# Patient Record
(demographics unavailable — no encounter records)

---

## 2024-10-21 NOTE — HISTORY OF PRESENT ILLNESS
[FreeTextEntry1] :  54-year-old white male with a past medical history of type 2 diabetes who is currently taking insulin Basaglar 40 units daily and insulin Fiasp 10 to 20 units 3 times a day before meals.  Patient was also taking Steglatro 15 mg daily but the insurance had denied the coverage and he had to stop the medication.  According to the patient his fingersticks have been fluctuating anywhere between 80 to 200 mg per DL.  After the meals they usually climb up to 250 mg per DL.  Patient was given a trial with Jardiance  25 mg tablets daily but he developed a diffuse itchy rash for this reason he had to stop taking the medication.  Physically the patient is quite active but his appetite is very good and he is on a regular basis.  He denies any significant polyuria or polydipsia.  His vision has been stable and he has not noticed any numbness or tingling of the extremities.  Review of systems is

## 2024-10-21 NOTE — REVIEW OF SYSTEMS
[Fatigue] : no fatigue [Decreased Appetite] : appetite not decreased [Recent Weight Gain (___ Lbs)] : recent weight gain: [unfilled] lbs [Negative] : Heme/Lymph

## 2024-10-21 NOTE — ASSESSMENT
[Diabetes Foot Care] : diabetes foot care [Long Term Vascular Complications] : long term vascular complications of diabetes [Carbohydrate Consistent Diet] : carbohydrate consistent diet [Importance of Diet and Exercise] : importance of diet and exercise to improve glycemic control, achieve weight loss and improve cardiovascular health [Exercise/Effect on Glucose] : exercise/effect on glucose [Hypoglycemia Management] : hypoglycemia management [Self Monitoring of Blood Glucose] : self monitoring of blood glucose [Retinopathy Screening] : Patient was referred to ophthalmology for retinopathy screening [FreeTextEntry1] :  patient white male who has a past medical history of for type 2 diabetes currently on insulin therapy and Steglatro which was discontinued.  His glucose level has been elevated and in the clinic it was 329 mg per DL.  His last hemoglobin A1c was 8.1%.  Patient physically is quite active but I suspect that he is dietary habits are still not optimal.  He does not show any signs of vascular complications  Except for erectile dysfunction.  Recommendation 1.  I have advised the patient to obtain a complete blood test including hemoglobin A1c level 2.  I will also give the patient a trial with Farxiga 10 mg tablets on a daily basis and if he develops a side effect with the skin rash then we may have to change it to Ozempic. 3.  Patient will continue with the insulin Basaglar 40 units every day together with the insulin Fiasp before the meals. 4.  The importance of diet exercise was again discussed with the patient. 5.  If the condition remained stable and the patient will follow-up in 3 months.  The plan was discussed with the patient thank you

## 2024-10-21 NOTE — PHYSICAL EXAM
[Alert] : alert [Well Nourished] : well nourished [Healthy Appearance] : healthy appearance [No Acute Distress] : no acute distress [Well Developed] : well developed [Normal Sclera/Conjunctiva] : normal sclera/conjunctiva [EOMI] : extra ocular movement intact [No Proptosis] : no proptosis [Normal Oropharynx] : the oropharynx was normal [Thyroid Not Enlarged] : the thyroid was not enlarged [No Thyroid Nodules] : no palpable thyroid nodules [No Respiratory Distress] : no respiratory distress [No Accessory Muscle Use] : no accessory muscle use [Clear to Auscultation] : lungs were clear to auscultation bilaterally [Normal S1, S2] : normal S1 and S2 [Normal Rate] : heart rate was normal [Regular Rhythm] : with a regular rhythm [No Edema] : no peripheral edema [Pedal Pulses Normal] : the pedal pulses are present [Normal Bowel Sounds] : normal bowel sounds [Not Tender] : non-tender [Not Distended] : not distended [Soft] : abdomen soft [Normal Anterior Cervical Nodes] : no anterior cervical lymphadenopathy [Normal Posterior Cervical Nodes] : no posterior cervical lymphadenopathy [No Spinal Tenderness] : no spinal tenderness [Spine Straight] : spine straight [No Stigmata of Cushings Syndrome] : no stigmata of Cushings Syndrome [Normal Gait] : normal gait [Normal Strength/Tone] : muscle strength and tone were normal [No Rash] : no rash [Acanthosis Nigricans] : no acanthosis nigricans [Normal Reflexes] : deep tendon reflexes were 2+ and symmetric [No Tremors] : no tremors [Oriented x3] : oriented to person, place, and time [de-identified] :   Patient's BMI is 24.6

## 2025-02-10 NOTE — PHYSICAL EXAM
[Alert] : alert [Well Nourished] : well nourished [No Acute Distress] : no acute distress [Well Developed] : well developed [Normal Sclera/Conjunctiva] : normal sclera/conjunctiva [EOMI] : extra ocular movement intact [No Proptosis] : no proptosis [Normal Oropharynx] : the oropharynx was normal [No Neck Mass] : no neck mass was observed [Thyroid Not Enlarged] : the thyroid was not enlarged [No Thyroid Nodules] : no palpable thyroid nodules [No Respiratory Distress] : no respiratory distress [No Accessory Muscle Use] : no accessory muscle use [Clear to Auscultation] : lungs were clear to auscultation bilaterally [Normal S1, S2] : normal S1 and S2 [Normal Rate] : heart rate was normal [Regular Rhythm] : with a regular rhythm [Carotids Normal] : carotid pulses were normal with no bruits [No Edema] : no peripheral edema [Pedal Pulses Normal] : the pedal pulses are present [Normal Appearance] : normal in appearance [No Masses] : no palpable masses [Normal Bowel Sounds] : normal bowel sounds [Not Tender] : non-tender [Not Distended] : not distended [Soft] : abdomen soft [No HSM] : no hepato-splenomegaly [Normal Supraclavicular Nodes] : no supraclavicular lymphadenopathy [Normal Anterior Cervical Nodes] : no anterior cervical lymphadenopathy [Normal Posterior Cervical Nodes] : no posterior cervical lymphadenopathy [No Spinal Tenderness] : no spinal tenderness [Spine Straight] : spine straight [No Stigmata of Cushings Syndrome] : no stigmata of Cushings Syndrome [Normal Gait] : normal gait [Normal Strength/Tone] : muscle strength and tone were normal [No Rash] : no rash [No Skin Lesions] : no skin lesions [Acanthosis Nigricans] : no acanthosis nigricans [Foot Ulcers] : no foot ulcers [No Motor Deficits] : the motor exam was normal [No Sensory Deficits] : the sensory exam was normal to light touch and pinprick [Normal Reflexes] : deep tendon reflexes were 2+ and symmetric [No Tremors] : no tremors [Normal Sensation on Monofilament Testing] : normal sensation on monofilament testing of lower extremities [Oriented x3] : oriented to person, place, and time [de-identified] :   Patient's BMI is at 25.2

## 2025-02-10 NOTE — HISTORY OF PRESENT ILLNESS
[FreeTextEntry1] : 54-year-old male with a medical history of DM2 present for a routine follow up visit. Patient is compliant with taking medications of Basaglar insulin, Dapagliflozin Propanediol, Farxiga, Fiasp, Finasteride, Steglarto, Aspirin, lisinopril, Promalyst. Patient has a past medical history of multiple myeloma cells for which he is under chemotherapy for. Patient denies any other significant symptoms of chest pain, sob, abdominal pain, nausea or vomiting. He states that recently has had low glucose levels in the morning down to the mid 50's. Patient is quite physically active by working out in the gym daily. His is compliant with his diet, weight is stable. He has not noticed any changes in his vision, denies any dysuria or polyuria. Circulation of the lower extremities remain stable bilateral, denies any paresthesia. review of systems otherwise is negative patient lately has noticed significant decrease in his sex drive and inability to maintain erections.  Patient has been trying over-the-counter medication which has boosted his energy level slightly.  Patient monitors hiis  glucose levels at home and they usually range in the low normal in the morning and in the afternoon they are up to 150 mg per DL.  He denies any significant polyuria or polydipsia.

## 2025-02-10 NOTE — ASSESSMENT
[Diabetes Foot Care] : diabetes foot care [Long Term Vascular Complications] : long term vascular complications of diabetes [Carbohydrate Consistent Diet] : carbohydrate consistent diet [Importance of Diet and Exercise] : importance of diet and exercise to improve glycemic control, achieve weight loss and improve cardiovascular health [Exercise/Effect on Glucose] : exercise/effect on glucose [Hypoglycemia Management] : hypoglycemia management [Self Monitoring of Blood Glucose] : self monitoring of blood glucose [Retinopathy Screening] : Patient was referred to ophthalmology for retinopathy screening [FreeTextEntry1] :  a.m. white male who has a past medical history of a type 2 diabetes, multiple myeloma in remission, history of chemotherapy and stem cell transplant presents for routine follow-up patient is tolerating the insulin together with the Farxiga but his uvula levels have been low in the morning.  Patient recently had a blood test on November 4, 2024 the TSH is normal 2.74, hemoglobin A1c is 8.3%,  complete metabolic panel is normal except for a glucose of 237 mg per DL patient also is experiencing significant decline in his sexual function which could be secondary to to the chemotherapy.  Patient is also on dexamethasone 8 mg tablet once a month when he goes for the infusion of the chemotherapy.  Recommendation 1.  I have advised the patient to divide the insulin Basaglar  into 15 units in the morning and 15 units at nighttime so that we can prevent any hypoglycemic episodes during the night.  Patient will continue with the insulin Fiasp a total of 30 units/day prior to the meals and Farxiga 10 mg tablets daily 2.  We will also give the patient a trial with Cialis 20 mg tablets as needed side effects were explained to the patient 3.  Will refer the patient for a blood test to check his total and free testosterone and also the PSA level. 4.  Patient was reassured about the sexual dysfunction which may improve once he is off the chemotherapy and steroids 5.  If clinically stable then the patient will follow-up in 3 months time.  Plan discussed with the patient thank you

## 2025-04-15 NOTE — ASSESSMENT
[FreeTextEntry1] : BPH: not bothersome c/w 1mg daily finasteride  Decreased libido: ? source chemo?  ED: c/w 10mg cialis (1/2 of 20mg) prn

## 2025-04-15 NOTE — PHYSICAL EXAM
[Normal Appearance] : normal appearance [Well Groomed] : well groomed [General Appearance - In No Acute Distress] : no acute distress [Edema] : no peripheral edema [Respiration, Rhythm And Depth] : normal respiratory rhythm and effort [Exaggerated Use Of Accessory Muscles For Inspiration] : no accessory muscle use [Prostate Tenderness] : the prostate was not tender [No Prostate Nodules] : no prostate nodules [Prostate Size ___ gm] : prostate size [unfilled] gm [Normal Station and Gait] : the gait and station were normal for the patient's age [] : no rash [No Focal Deficits] : no focal deficits [Oriented To Time, Place, And Person] : oriented to person, place, and time [Affect] : the affect was normal [Mood] : the mood was normal [No Palpable Adenopathy] : no palpable adenopathy

## 2025-04-15 NOTE — HISTORY OF PRESENT ILLNESS
[FreeTextEntry1] : 55yo male hx of multiple myeloma on chemo maintenance, presents for f/u He denies any urinary complaints Previously reports frequency and urgency  He also reports decreased libido. Recent testosterone and TSH were normal  PSA 1.84 on 3/2025 PSA 2.46 on 4/2024   He also complains of hair loss

## 2025-06-24 NOTE — HISTORY OF PRESENT ILLNESS
[FreeTextEntry1] : 55-year-old white male with a past medical history of type 2 diabetes and who is currently taking insulin Basaglar 40 units at night and also short acting insulin  Fiasp 5 to 10 units before each meal according to the patient he has placed himself on a low carbohydrate diet and as a result he has lost approximately 10 pounds.  Also the patient continues to receive chemotherapy for the multiple myeloma at which time he is given dexamethasone 10 mg for 1 dose.  Because those have been fluctuating after meals they will rise up to 1 8200 mg per DL.  Patient is physically is active and exercising on a regular basis.  He denies any chest pain shortness of breath but has some tingling sensation on the lateral side of the left lower extremity.  Also the patient has noticed increased erections since he has been taking an over-the-counter supplement for his erectile dysfunction.  His other medications include lisinopril.

## 2025-06-24 NOTE — PHYSICAL EXAM
[Alert] : alert [Well Nourished] : well nourished [No Acute Distress] : no acute distress [Well Developed] : well developed [Normal Sclera/Conjunctiva] : normal sclera/conjunctiva [EOMI] : extra ocular movement intact [No Proptosis] : no proptosis [Normal Oropharynx] : the oropharynx was normal [Thyroid Not Enlarged] : the thyroid was not enlarged [No Thyroid Nodules] : no palpable thyroid nodules [No Respiratory Distress] : no respiratory distress [No Accessory Muscle Use] : no accessory muscle use [Clear to Auscultation] : lungs were clear to auscultation bilaterally [Normal S1, S2] : normal S1 and S2 [Normal Rate] : heart rate was normal [Regular Rhythm] : with a regular rhythm [No Edema] : no peripheral edema [Pedal Pulses Normal] : the pedal pulses are present [Normal Bowel Sounds] : normal bowel sounds [Not Tender] : non-tender [Not Distended] : not distended [Soft] : abdomen soft [Normal Anterior Cervical Nodes] : no anterior cervical lymphadenopathy [Normal Posterior Cervical Nodes] : no posterior cervical lymphadenopathy [No Spinal Tenderness] : no spinal tenderness [Spine Straight] : spine straight [No Stigmata of Cushings Syndrome] : no stigmata of Cushings Syndrome [Normal Gait] : normal gait [Normal Strength/Tone] : muscle strength and tone were normal [No Rash] : no rash [No Skin Lesions] : no skin lesions [Acanthosis Nigricans] : no acanthosis nigricans [Foot Ulcers] : no foot ulcers [No Motor Deficits] : the motor exam was normal [No Sensory Deficits] : the sensory exam was normal to light touch and pinprick [Normal Reflexes] : deep tendon reflexes were 2+ and symmetric [No Tremors] : no tremors [Oriented x3] : oriented to person, place, and time [de-identified] : Patient BMI is 23.85 and the weight is 171 pounds which is down from 180 pounds

## 2025-06-24 NOTE — ASSESSMENT
[Diabetes Foot Care] : diabetes foot care [Long Term Vascular Complications] : long term vascular complications of diabetes [Carbohydrate Consistent Diet] : carbohydrate consistent diet [Importance of Diet and Exercise] : importance of diet and exercise to improve glycemic control, achieve weight loss and improve cardiovascular health [Exercise/Effect on Glucose] : exercise/effect on glucose [Hypoglycemia Management] : hypoglycemia management [Self Monitoring of Blood Glucose] : self monitoring of blood glucose [Retinopathy Screening] : Patient was referred to ophthalmology for retinopathy screening [FreeTextEntry1] : Male white male who has a past medical history of type 2 diabetes and who is receiving dexamethasone presents with a main complaint of numbness and tingling of the left lower extremity together with variations in his glucose levels especially after the meals patient has succeeded in losing approximately 10 pounds through a strict low carbohydrate diet and exercise regimen.  His glucose levels in the morning are usually approximately 120 mg per DL but after the meals they will rise up to 180 mg per DL.  Recommendation 1.  I have advised the patient to consider using a glucose monitoring system in this way we will get a better idea of the variation in the glucose levels throughout the whole day.  Patient is agreeable to this but he will consider this at the end of the season. 2.  Patient will continue on his current insulin regimen and to take the short acting insulin on a regular basis prior to the meals 3.  I am also referring the patient for a complete blood analysis and to check his hemoglobin A1c level which was elevated in March at 8.3%.  Also his complete metabolic panel was normal except for glucose of 195 and the total testosterone was 807 and the free testosterone of 5.9 4.  If clinically stable then the patient will return to the office in 3 months time.  The plan was discussed in detail with the patient.  Thank you